# Patient Record
Sex: FEMALE | URBAN - NONMETROPOLITAN AREA
[De-identification: names, ages, dates, MRNs, and addresses within clinical notes are randomized per-mention and may not be internally consistent; named-entity substitution may affect disease eponyms.]

---

## 2020-01-22 ENCOUNTER — OFFICE VISIT (OUTPATIENT)
Dept: FAMILY MEDICINE CLINIC | Age: 45
End: 2020-01-22
Payer: COMMERCIAL

## 2020-01-22 VITALS
DIASTOLIC BLOOD PRESSURE: 88 MMHG | BODY MASS INDEX: 32.28 KG/M2 | WEIGHT: 213 LBS | OXYGEN SATURATION: 98 % | TEMPERATURE: 97.6 F | SYSTOLIC BLOOD PRESSURE: 136 MMHG | HEIGHT: 68 IN | RESPIRATION RATE: 18 BRPM | HEART RATE: 75 BPM

## 2020-01-22 PROCEDURE — 99213 OFFICE O/P EST LOW 20 MIN: CPT | Performed by: PHYSICIAN ASSISTANT

## 2020-01-22 RX ORDER — CLINDAMYCIN HYDROCHLORIDE 150 MG/1
CAPSULE ORAL
COMMUNITY
Start: 2020-01-20 | End: 2020-05-12 | Stop reason: ALTCHOICE

## 2020-01-22 RX ORDER — PREDNISONE 10 MG/1
TABLET ORAL
Qty: 20 TABLET | Refills: 0 | Status: SHIPPED | OUTPATIENT
Start: 2020-01-22 | End: 2020-01-30

## 2020-01-22 NOTE — PROGRESS NOTES
to upper anterior thighs and across the lower abdomen. There is no evidence of petechiae or purpura rash. No pustules, warmth, open areas or signs of secondary infection noted. No target lesions. Neuro: Alert and oriented x3. Speech is articulate and fluent. Psych: Mood/Affect: Patient's mood and affect is appropriate to situation. Zunilda Sanches was seen today for rash. Diagnoses and all orders for this visit:    Dermatitis  -     predniSONE (DELTASONE) 10 MG tablet; Take 4 tablets by mouth daily for 2 days, THEN 3 tablets daily for 2 days, THEN 2 tablets daily for 2 days, THEN 1 tablet daily for 2 days. Recommend non drowsy OTC antihistamines for 10 days. Patient is to follow-up with their PCP in the next 5-7 days. If there are any new or worsening symptoms to the emergency department.     Seen By:    Kory Holm PA-C

## 2020-05-12 ENCOUNTER — OFFICE VISIT (OUTPATIENT)
Dept: FAMILY MEDICINE CLINIC | Age: 45
End: 2020-05-12
Payer: COMMERCIAL

## 2020-05-12 VITALS
DIASTOLIC BLOOD PRESSURE: 96 MMHG | BODY MASS INDEX: 41.05 KG/M2 | WEIGHT: 270 LBS | SYSTOLIC BLOOD PRESSURE: 156 MMHG | TEMPERATURE: 97.8 F | HEART RATE: 73 BPM | OXYGEN SATURATION: 98 %

## 2020-05-12 PROCEDURE — 99213 OFFICE O/P EST LOW 20 MIN: CPT | Performed by: PHYSICIAN ASSISTANT

## 2020-05-12 RX ORDER — KETOCONAZOLE 20 MG/G
CREAM TOPICAL
Qty: 30 G | Refills: 2 | Status: SHIPPED | OUTPATIENT
Start: 2020-05-12

## 2020-05-12 NOTE — PROGRESS NOTES
2020   [facility] [address] [facility]  [phone]    Minnie Kenney  : 1975  Age: 50 y.o. Sex: female      Subjective:  Chief Complaint   Patient presents with    Rash       HPI:  Patient states rash started over a patient states the rash initially started back in January she was seen and evaluated was placed on prednisone. Patient correlates this rash with her hot tub. Patient states the rash has been intermittent for the last few months. The rash is pruritic in nature. No pain. The patient denies any other food, drug and or environmental allergens. Denies throat swelling or dyspnea. Denies fever or chills. ROS:  Positive and pertinent negatives as per HPI. All other systems are reviewed and negative. Current Outpatient Medications:     ketoconazole (NIZORAL) 2 % cream, Apply topically daily. , Disp: 30 g, Rfl: 2    Albuterol Sulfate (ALBUTEROL 2 MG/5 ML ORAL SOLN CMPD), Inhale into the lungs, Disp: , Rfl:    No Known Allergies     Objective:  Vitals:    20 1137   BP: (!) 156/96   Pulse: 73   Temp: 97.8 °F (36.6 °C)   TempSrc: Temporal   SpO2: 98%   Weight: 270 lb (122.5 kg)        Exam:  Const: Appears healthy and well developed. No signs of acute distress present. Vitals reviewed per triage. Head/Face: Normocephalic, atraumatic. Facies is symmetric. ENMT:  Nares are patent. Buccal mucosa is moist.  No inflammation or edema of the posterior oropharynx. Neck: Trachea midline. Resp: No respiratory distress. Lungs clear to auscultation bilaterally all lung fieds. Musculo: Patient moves extremities without pain or limitation. Skin: Skin is warm and dry. The patient has circular erythematous rash with a scaly center consistent with tinea a few scattered lesions to her upper extremities and to her lower extremities. There is no evidence of petechiae or purpura rash. No vesicles pustules or open areas or signs of secondary infection noted.

## 2025-08-20 ENCOUNTER — OFFICE VISIT (OUTPATIENT)
Dept: FAMILY MEDICINE CLINIC | Age: 50
End: 2025-08-20
Payer: COMMERCIAL

## 2025-08-20 VITALS
RESPIRATION RATE: 18 BRPM | HEIGHT: 68 IN | OXYGEN SATURATION: 97 % | HEART RATE: 95 BPM | SYSTOLIC BLOOD PRESSURE: 122 MMHG | DIASTOLIC BLOOD PRESSURE: 82 MMHG | TEMPERATURE: 97.6 F | WEIGHT: 205 LBS | BODY MASS INDEX: 31.07 KG/M2

## 2025-08-20 DIAGNOSIS — M79.89 LEFT LEG SWELLING: ICD-10-CM

## 2025-08-20 DIAGNOSIS — R06.02 SHORTNESS OF BREATH: ICD-10-CM

## 2025-08-20 DIAGNOSIS — R07.9 CHEST PAIN, UNSPECIFIED TYPE: Primary | ICD-10-CM

## 2025-08-20 PROCEDURE — 99205 OFFICE O/P NEW HI 60 MIN: CPT | Performed by: PHYSICIAN ASSISTANT

## 2025-08-20 PROCEDURE — 93000 ELECTROCARDIOGRAM COMPLETE: CPT | Performed by: PHYSICIAN ASSISTANT

## 2025-08-20 RX ORDER — HYDROCHLOROTHIAZIDE 25 MG/1
TABLET ORAL
COMMUNITY
Start: 2025-08-08

## 2025-08-20 ASSESSMENT — ENCOUNTER SYMPTOMS
BACK PAIN: 0
SORE THROAT: 0
NAUSEA: 0
VOMITING: 0
ABDOMINAL PAIN: 0
PHOTOPHOBIA: 0
COUGH: 0
SHORTNESS OF BREATH: 1
DIARRHEA: 0